# Patient Record
Sex: FEMALE | Race: OTHER | NOT HISPANIC OR LATINO | URBAN - METROPOLITAN AREA
[De-identification: names, ages, dates, MRNs, and addresses within clinical notes are randomized per-mention and may not be internally consistent; named-entity substitution may affect disease eponyms.]

---

## 2017-10-05 ENCOUNTER — OUTPATIENT (OUTPATIENT)
Dept: OUTPATIENT SERVICES | Facility: HOSPITAL | Age: 62
LOS: 1 days | End: 2017-10-05
Payer: COMMERCIAL

## 2017-10-05 VITALS — RESPIRATION RATE: 15 BRPM | WEIGHT: 136.91 LBS | TEMPERATURE: 98 F | HEIGHT: 63 IN

## 2017-10-05 DIAGNOSIS — S83.249A OTHER TEAR OF MEDIAL MENISCUS, CURRENT INJURY, UNSPECIFIED KNEE, INITIAL ENCOUNTER: ICD-10-CM

## 2017-10-05 DIAGNOSIS — E11.9 TYPE 2 DIABETES MELLITUS WITHOUT COMPLICATIONS: ICD-10-CM

## 2017-10-05 DIAGNOSIS — Z01.818 ENCOUNTER FOR OTHER PREPROCEDURAL EXAMINATION: ICD-10-CM

## 2017-10-05 DIAGNOSIS — S83.242A OTHER TEAR OF MEDIAL MENISCUS, CURRENT INJURY, LEFT KNEE, INITIAL ENCOUNTER: ICD-10-CM

## 2017-10-05 DIAGNOSIS — M75.100 UNSPECIFIED ROTATOR CUFF TEAR OR RUPTURE OF UNSPECIFIED SHOULDER, NOT SPECIFIED AS TRAUMATIC: Chronic | ICD-10-CM

## 2017-10-05 DIAGNOSIS — N63 UNSPECIFIED LUMP IN BREAST: Chronic | ICD-10-CM

## 2017-10-05 PROCEDURE — G0463: CPT

## 2017-10-05 RX ORDER — SODIUM CHLORIDE 9 MG/ML
3 INJECTION INTRAMUSCULAR; INTRAVENOUS; SUBCUTANEOUS EVERY 8 HOURS
Qty: 0 | Refills: 0 | Status: DISCONTINUED | OUTPATIENT
Start: 2017-10-10 | End: 2017-10-18

## 2017-10-05 NOTE — H&P PST ADULT - PMH
Asthma  SEASONAL ALLERGY , USING ALBUTEROL AS NEEDED  Breast cancer  RIGHT BREAST  Diabetes mellitus    Hyperlipidemia    Medial meniscus tear  left  Rotator cuff tear  LEFT SHOULDER

## 2017-10-05 NOTE — H&P PST ADULT - RS GEN PE MLT RESP DETAILS PC
clear to auscultation bilaterally/airway patent/normal/breath sounds equal/good air movement/respirations non-labored

## 2017-10-05 NOTE — H&P PST ADULT - HISTORY OF PRESENT ILLNESS
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 Pt presents with left knee tear of the medial meniscus. Pt stated a fall occurred two years ago and over time her knee was causing discomfort up until March / April with the inability to walk and now limping. Pt had the knee drained two times with little effect and physical therapist. In MAY/ June an MRI was performed indicating the need to repair meniscus in her left knee.

## 2017-10-05 NOTE — H&P PST ADULT - NSANTHOSAYNRD_GEN_A_CORE
No. MARCO A screening performed.  STOP BANG Legend: 0-2 = LOW Risk; 3-4 = INTERMEDIATE Risk; 5-8 = HIGH Risk

## 2017-10-09 ENCOUNTER — TRANSCRIPTION ENCOUNTER (OUTPATIENT)
Age: 62
End: 2017-10-09

## 2017-10-10 ENCOUNTER — RESULT REVIEW (OUTPATIENT)
Age: 62
End: 2017-10-10

## 2017-10-10 ENCOUNTER — OUTPATIENT (OUTPATIENT)
Dept: OUTPATIENT SERVICES | Facility: HOSPITAL | Age: 62
LOS: 1 days | Discharge: HOME | End: 2017-10-10
Payer: COMMERCIAL

## 2017-10-10 VITALS
DIASTOLIC BLOOD PRESSURE: 67 MMHG | TEMPERATURE: 97 F | OXYGEN SATURATION: 100 % | RESPIRATION RATE: 14 BRPM | SYSTOLIC BLOOD PRESSURE: 103 MMHG | WEIGHT: 136.91 LBS | HEART RATE: 81 BPM | HEIGHT: 63 IN

## 2017-10-10 VITALS
SYSTOLIC BLOOD PRESSURE: 111 MMHG | HEART RATE: 70 BPM | RESPIRATION RATE: 16 BRPM | DIASTOLIC BLOOD PRESSURE: 60 MMHG | TEMPERATURE: 98 F

## 2017-10-10 DIAGNOSIS — S83.242A OTHER TEAR OF MEDIAL MENISCUS, CURRENT INJURY, LEFT KNEE, INITIAL ENCOUNTER: ICD-10-CM

## 2017-10-10 DIAGNOSIS — N63 UNSPECIFIED LUMP IN BREAST: Chronic | ICD-10-CM

## 2017-10-10 DIAGNOSIS — M75.100 UNSPECIFIED ROTATOR CUFF TEAR OR RUPTURE OF UNSPECIFIED SHOULDER, NOT SPECIFIED AS TRAUMATIC: Chronic | ICD-10-CM

## 2017-10-10 DIAGNOSIS — Z01.818 ENCOUNTER FOR OTHER PREPROCEDURAL EXAMINATION: ICD-10-CM

## 2017-10-10 PROCEDURE — 88304 TISSUE EXAM BY PATHOLOGIST: CPT

## 2017-10-10 PROCEDURE — 88304 TISSUE EXAM BY PATHOLOGIST: CPT | Mod: 26

## 2017-10-10 PROCEDURE — 29880 ARTHRS KNE SRG MNISECTMY M&L: CPT | Mod: LT

## 2017-10-10 RX ORDER — OXYCODONE AND ACETAMINOPHEN 5; 325 MG/1; MG/1
1 TABLET ORAL EVERY 4 HOURS
Qty: 0 | Refills: 0 | Status: DISCONTINUED | OUTPATIENT
Start: 2017-10-10 | End: 2017-10-10

## 2017-10-10 RX ORDER — HYDROMORPHONE HYDROCHLORIDE 2 MG/ML
0.5 INJECTION INTRAMUSCULAR; INTRAVENOUS; SUBCUTANEOUS
Qty: 0 | Refills: 0 | Status: DISCONTINUED | OUTPATIENT
Start: 2017-10-10 | End: 2017-10-10

## 2017-10-10 RX ORDER — IPRATROPIUM/ALBUTEROL SULFATE 18-103MCG
3 AEROSOL WITH ADAPTER (GRAM) INHALATION
Qty: 0 | Refills: 0 | COMMUNITY

## 2017-10-10 RX ORDER — VALSARTAN 80 MG/1
0 TABLET ORAL
Qty: 0 | Refills: 0 | COMMUNITY

## 2017-10-10 RX ORDER — SIMVASTATIN 20 MG/1
1 TABLET, FILM COATED ORAL
Qty: 0 | Refills: 0 | COMMUNITY

## 2017-10-10 RX ORDER — SODIUM CHLORIDE 9 MG/ML
1000 INJECTION, SOLUTION INTRAVENOUS
Qty: 0 | Refills: 0 | Status: DISCONTINUED | OUTPATIENT
Start: 2017-10-10 | End: 2017-10-18

## 2017-10-10 RX ORDER — OXYCODONE AND ACETAMINOPHEN 5; 325 MG/1; MG/1
2 TABLET ORAL EVERY 6 HOURS
Qty: 0 | Refills: 0 | Status: DISCONTINUED | OUTPATIENT
Start: 2017-10-10 | End: 2017-10-10

## 2017-10-10 RX ORDER — ONDANSETRON 8 MG/1
4 TABLET, FILM COATED ORAL EVERY 6 HOURS
Qty: 0 | Refills: 0 | Status: DISCONTINUED | OUTPATIENT
Start: 2017-10-10 | End: 2017-10-18

## 2017-10-10 RX ORDER — INSULIN ASPART 100 [IU]/ML
6 INJECTION, SOLUTION SUBCUTANEOUS
Qty: 0 | Refills: 0 | COMMUNITY

## 2017-10-10 RX ADMIN — SODIUM CHLORIDE 3 MILLILITER(S): 9 INJECTION INTRAMUSCULAR; INTRAVENOUS; SUBCUTANEOUS at 06:44

## 2017-10-10 RX ADMIN — HYDROMORPHONE HYDROCHLORIDE 0.5 MILLIGRAM(S): 2 INJECTION INTRAMUSCULAR; INTRAVENOUS; SUBCUTANEOUS at 08:20

## 2017-10-10 NOTE — BRIEF OPERATIVE NOTE - PROCEDURE
<<-----Click on this checkbox to enter Procedure Arthroscopy  10/10/2017  left knee. partial medial and lateral menisectomy,chondroplasty and synovectomy  Active  SPILLAI

## 2017-10-10 NOTE — BRIEF OPERATIVE NOTE - POST-OP DX
Other tear of medial meniscus of left knee, unspecified whether old or current tear, subsequent encounter  10/10/2017  left knee medail and lateral meniscus tear,Chondromalacia,Synovitis  Active  Toney Willams

## 2017-10-10 NOTE — ASU DISCHARGE PLAN (ADULT/PEDIATRIC). - MEDICATION SUMMARY - MEDICATIONS TO TAKE
I will START or STAY ON the medications listed below when I get home from the hospital:    valsartan 40 mg oral tablet  -- orally once a day  -- Indication: For As directed    glipiZIDE 10 mg oral tablet  -- 1 tab(s) by mouth once a day  -- Indication: For As directed    NovoLOG 100 units/mL subcutaneous solution  -- 6 unit(s) subcutaneous , As Needed  -- Indication: For As directed    fenofibrate 160 mg oral tablet  -- 1 tab(s) by mouth once a day  -- Indication: For As directed    simvastatin 80 mg oral tablet  -- 1 tab(s) by mouth once a day (at bedtime)  -- Indication: For As directed    ipratropium-albuterol 0.5 mg-2.5 mg/3 mLinhalation solution  -- 3 milliliter(s) inhaled 4 times a day, As Needed  -- Indication: For As directed    Singulair 10 mg oral tablet  -- 1 tab(s) by mouth once a day (in the evening)  -- Indication: For As directed I will START or STAY ON the medications listed below when I get home from the hospital:    Percocet 5/325 oral tablet  -- 1 tab(s) by mouth every 4 hours MDD:6 tabs  -- Caution federal law prohibits the transfer of this drug to any person other  than the person for whom it was prescribed.  May cause drowsiness.  Alcohol may intensify this effect.  Use care when operating dangerous machinery.  This prescription cannot be refilled.  This product contains acetaminophen.  Do not use  with any other product containing acetaminophen to prevent possible liver damage.  Using more of this medication than prescribed may cause serious breathing problems.    -- Indication: For pain    valsartan 40 mg oral tablet  -- orally once a day  -- Indication: For As directed    glipiZIDE 10 mg oral tablet  -- 1 tab(s) by mouth once a day  -- Indication: For As directed    NovoLOG 100 units/mL subcutaneous solution  -- 6 unit(s) subcutaneous , As Needed  -- Indication: For As directed    simvastatin 80 mg oral tablet  -- 1 tab(s) by mouth once a day (at bedtime)  -- Indication: For As directed    fenofibrate 160 mg oral tablet  -- 1 tab(s) by mouth once a day  -- Indication: For As directed    ipratropium-albuterol 0.5 mg-2.5 mg/3 mLinhalation solution  -- 3 milliliter(s) inhaled 4 times a day, As Needed  -- Indication: For As directed    Singulair 10 mg oral tablet  -- 1 tab(s) by mouth once a day (in the evening)  -- Indication: For As directed
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
## 2017-10-13 DIAGNOSIS — Z92.21 PERSONAL HISTORY OF ANTINEOPLASTIC CHEMOTHERAPY: ICD-10-CM

## 2017-10-13 DIAGNOSIS — Z85.3 PERSONAL HISTORY OF MALIGNANT NEOPLASM OF BREAST: ICD-10-CM

## 2017-10-13 DIAGNOSIS — J30.2 OTHER SEASONAL ALLERGIC RHINITIS: ICD-10-CM

## 2017-10-13 DIAGNOSIS — S83.282A OTHER TEAR OF LATERAL MENISCUS, CURRENT INJURY, LEFT KNEE, INITIAL ENCOUNTER: ICD-10-CM
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
## 2017-10-13 DIAGNOSIS — Z79.82 LONG TERM (CURRENT) USE OF ASPIRIN: ICD-10-CM
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
## 2017-10-13 DIAGNOSIS — Z79.4 LONG TERM (CURRENT) USE OF INSULIN: ICD-10-CM

## 2017-10-13 DIAGNOSIS — E11.9 TYPE 2 DIABETES MELLITUS WITHOUT COMPLICATIONS: ICD-10-CM

## 2017-10-13 DIAGNOSIS — D64.9 ANEMIA, UNSPECIFIED: ICD-10-CM

## 2017-10-13 DIAGNOSIS — S83.242A OTHER TEAR OF MEDIAL MENISCUS, CURRENT INJURY, LEFT KNEE, INITIAL ENCOUNTER: ICD-10-CM
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
## 2017-10-13 DIAGNOSIS — E78.5 HYPERLIPIDEMIA, UNSPECIFIED: ICD-10-CM

## 2017-10-13 DIAGNOSIS — K21.9 GASTRO-ESOPHAGEAL REFLUX DISEASE WITHOUT ESOPHAGITIS: ICD-10-CM

## 2017-10-13 DIAGNOSIS — M65.862 OTHER SYNOVITIS AND TENOSYNOVITIS, LEFT LOWER LEG: ICD-10-CM

## 2017-10-13 DIAGNOSIS — M23.42 LOOSE BODY IN KNEE, LEFT KNEE: ICD-10-CM

## 2017-10-13 LAB — SURGICAL PATHOLOGY FINAL REPORT - CH: SIGNIFICANT CHANGE UP

## 2021-08-20 PROBLEM — S83.249A OTHER TEAR OF MEDIAL MENISCUS, CURRENT INJURY, UNSPECIFIED KNEE, INITIAL ENCOUNTER: Chronic | Status: ACTIVE | Noted: 2017-10-05

## 2021-09-01 PROBLEM — Z00.00 ENCOUNTER FOR PREVENTIVE HEALTH EXAMINATION: Status: ACTIVE | Noted: 2021-09-01

## 2021-10-08 ENCOUNTER — APPOINTMENT (OUTPATIENT)
Dept: DISASTER EMERGENCY | Facility: CLINIC | Age: 66
End: 2021-10-08

## 2021-10-09 ENCOUNTER — APPOINTMENT (OUTPATIENT)
Dept: DISASTER EMERGENCY | Facility: CLINIC | Age: 66
End: 2021-10-09

## 2021-10-09 DIAGNOSIS — Z01.818 ENCOUNTER FOR OTHER PREPROCEDURAL EXAMINATION: ICD-10-CM

## 2021-10-10 LAB — SARS-COV-2 N GENE NPH QL NAA+PROBE: NOT DETECTED

## 2021-10-12 ENCOUNTER — APPOINTMENT (OUTPATIENT)
Dept: PULMONOLOGY | Facility: CLINIC | Age: 66
End: 2021-10-12
Payer: MEDICARE

## 2021-10-12 ENCOUNTER — APPOINTMENT (OUTPATIENT)
Dept: PULMONOLOGY | Facility: CLINIC | Age: 66
End: 2021-10-12
Payer: SELF-PAY

## 2021-10-12 VITALS — TEMPERATURE: 97.3 F

## 2021-10-12 DIAGNOSIS — Z86.39 PERSONAL HISTORY OF OTHER ENDOCRINE, NUTRITIONAL AND METABOLIC DISEASE: ICD-10-CM

## 2021-10-12 DIAGNOSIS — R91.8 OTHER NONSPECIFIC ABNORMAL FINDING OF LUNG FIELD: ICD-10-CM

## 2021-10-12 DIAGNOSIS — Z87.09 PERSONAL HISTORY OF OTHER DISEASES OF THE RESPIRATORY SYSTEM: ICD-10-CM

## 2021-10-12 DIAGNOSIS — Z85.3 PERSONAL HISTORY OF MALIGNANT NEOPLASM OF BREAST: ICD-10-CM

## 2021-10-12 DIAGNOSIS — R93.89 ABNORMAL FINDINGS ON DIAGNOSTIC IMAGING OF OTHER SPECIFIED BODY STRUCTURES: ICD-10-CM

## 2021-10-12 PROCEDURE — 94726 PLETHYSMOGRAPHY LUNG VOLUMES: CPT

## 2021-10-12 PROCEDURE — 94010 BREATHING CAPACITY TEST: CPT

## 2021-10-12 PROCEDURE — 94729 DIFFUSING CAPACITY: CPT

## 2021-10-12 PROCEDURE — ZZZZZ: CPT

## 2021-10-12 PROCEDURE — 99204 OFFICE O/P NEW MOD 45 MIN: CPT | Mod: 25

## 2021-10-12 NOTE — ASSESSMENT
[FreeTextEntry1] : 65F h/o breast cancer and mild intermittent asthma.\par CT personally reviewed at [facility] - multiple small 2-3mm pulmonary nodules and very subtle patchy GGO which is nonspecific and likely inflammatory in nature. Stable compared to CT from 7/21.\par Pt is grossly asymptomatic from pulmonary standpoint.\par Consider repeat CT in 1yr given h/o malignancy.\par

## 2021-10-12 NOTE — HISTORY OF PRESENT ILLNESS
[TextBox_4] : 65F here for evaluation of abnormal imaging. Has a h/o intermittent asthma and uses albuterol about 2 times  week. Has no chronic sob, paitno or cough. She has h/o breast ca s/p mastectomy. \par Pt reports she recently had imaging done in response to an elevated Ca level. She is here to review those findings.\par

## 2021-10-12 NOTE — CONSULT LETTER
[Dear  ___] : Dear  [unfilled], [Consult Letter:] : I had the pleasure of evaluating your patient, [unfilled]. [Please see my note below.] : Please see my note below. [Consult Closing:] : Thank you very much for allowing me to participate in the care of this patient.  If you have any questions, please do not hesitate to contact me. [Sincerely,] : Sincerely, [FreeTextEntry3] : Abimbola Douglas MD, [facility]